# Patient Record
Sex: FEMALE | Race: WHITE | NOT HISPANIC OR LATINO | Employment: OTHER | URBAN - NONMETROPOLITAN AREA
[De-identification: names, ages, dates, MRNs, and addresses within clinical notes are randomized per-mention and may not be internally consistent; named-entity substitution may affect disease eponyms.]

---

## 2022-04-07 ENCOUNTER — NEW PATIENT (OUTPATIENT)
Dept: URBAN - NONMETROPOLITAN AREA CLINIC 6 | Facility: CLINIC | Age: 62
End: 2022-04-07

## 2022-04-07 DIAGNOSIS — D31.32: ICD-10-CM

## 2022-04-07 DIAGNOSIS — H25.13: ICD-10-CM

## 2022-04-07 PROCEDURE — 92310E CONTACT LENS 100

## 2022-04-07 PROCEDURE — 92004 COMPRE OPH EXAM NEW PT 1/>: CPT

## 2022-04-07 PROCEDURE — 92015 DETERMINE REFRACTIVE STATE: CPT

## 2022-04-07 NOTE — PATIENT DISCUSSION
New contact trials given, refit back to [facility] tor[facility] - monovision (OD dist/ OS near).  RTC for CL f/u to finalize CL Rx.

## 2022-04-11 ASSESSMENT — KERATOMETRY
OS_AXISANGLE2_DEGREES: 179
OS_AXISANGLE_DEGREES: 089
OD_AXISANGLE_DEGREES: 078
OD_K1POWER_DIOPTERS: 42.50
OD_K2POWER_DIOPTERS: 43.75
OS_K1POWER_DIOPTERS: 43.00
OS_K2POWER_DIOPTERS: 44.00
OD_AXISANGLE2_DEGREES: 168

## 2022-04-11 ASSESSMENT — VISUAL ACUITY
OS_GLARE: 20/40
OD_GLARE: 20/40
OS_CC: 20/25
OD_CC: 20/25-1

## 2022-04-11 ASSESSMENT — TONOMETRY
OD_IOP_MMHG: 17
OS_IOP_MMHG: 16

## 2022-04-22 ENCOUNTER — CONTACT LENSES/GLASSES VISIT (OUTPATIENT)
Dept: URBAN - NONMETROPOLITAN AREA CLINIC 6 | Facility: CLINIC | Age: 62
End: 2022-04-22

## 2022-04-22 DIAGNOSIS — H25.13: ICD-10-CM

## 2022-04-22 PROCEDURE — 99211NC NO CHARGE VISIT

## 2022-04-22 ASSESSMENT — VISUAL ACUITY
OD_CC: 20/25
OS_CC: J1+

## 2022-04-22 NOTE — PATIENT DISCUSSION
New CL Rx given. Patient would like to continue with [facility] toric and will increase tears for comfort. Discussed daily disposables if dryness worsens.

## 2022-06-27 RX ORDER — AMLODIPINE BESYLATE 10 MG/1
TABLET ORAL
COMMUNITY
Start: 2022-05-18

## 2022-06-27 RX ORDER — BIOTIN 1 MG
TABLET ORAL
COMMUNITY
Start: 2022-05-18

## 2022-06-27 RX ORDER — TETRACYCLINE HCL 500 MG
CAPSULE ORAL
COMMUNITY
Start: 2022-05-18

## 2022-10-23 PROBLEM — I10 HTN (HYPERTENSION), BENIGN: Status: ACTIVE | Noted: 2022-10-23

## 2022-10-23 PROBLEM — K63.5 COLON POLYPS: Status: ACTIVE | Noted: 2022-10-23
